# Patient Record
Sex: MALE | Race: WHITE
[De-identification: names, ages, dates, MRNs, and addresses within clinical notes are randomized per-mention and may not be internally consistent; named-entity substitution may affect disease eponyms.]

---

## 2019-08-20 ENCOUNTER — HOSPITAL ENCOUNTER (OUTPATIENT)
Dept: HOSPITAL 47 - OR | Age: 39
Discharge: HOME | End: 2019-08-20
Attending: SURGERY
Payer: COMMERCIAL

## 2019-08-20 VITALS — TEMPERATURE: 97.3 F | RESPIRATION RATE: 16 BRPM

## 2019-08-20 VITALS — DIASTOLIC BLOOD PRESSURE: 67 MMHG | HEART RATE: 58 BPM | SYSTOLIC BLOOD PRESSURE: 103 MMHG

## 2019-08-20 VITALS — BODY MASS INDEX: 29.2 KG/M2

## 2019-08-20 DIAGNOSIS — Z98.890: ICD-10-CM

## 2019-08-20 DIAGNOSIS — G47.33: ICD-10-CM

## 2019-08-20 DIAGNOSIS — Z80.9: ICD-10-CM

## 2019-08-20 DIAGNOSIS — Z99.89: ICD-10-CM

## 2019-08-20 DIAGNOSIS — E78.5: ICD-10-CM

## 2019-08-20 DIAGNOSIS — D17.6: ICD-10-CM

## 2019-08-20 DIAGNOSIS — I10: ICD-10-CM

## 2019-08-20 DIAGNOSIS — F32.9: ICD-10-CM

## 2019-08-20 DIAGNOSIS — K40.90: Primary | ICD-10-CM

## 2019-08-20 DIAGNOSIS — Z79.899: ICD-10-CM

## 2019-08-20 DIAGNOSIS — F17.210: ICD-10-CM

## 2019-08-20 PROCEDURE — 88304 TISSUE EXAM BY PATHOLOGIST: CPT

## 2019-08-20 PROCEDURE — 49650 LAP ING HERNIA REPAIR INIT: CPT

## 2019-08-20 RX ADMIN — POTASSIUM CHLORIDE SCH MLS: 14.9 INJECTION, SOLUTION INTRAVENOUS at 06:45

## 2019-08-20 NOTE — P.GSHP
History of Present Illness


H&P Date: 08/20/19


Chief Complaint: Left inguinal hernia





This is a 39-year-old male who presents today for laparoscopic robotic





Left inguinal hernia.  Patient developed a tender mass left groin.





Past Medical History


Past Medical History: Hyperlipidemia, Hypertension, Sleep Apnea/CPAP/BIPAP


Additional Past Medical History / Comment(s): CPAP USE


History of Any Multi-Drug Resistant Organisms: None Reported


Past Surgical History: Hernia Repair


Past Anesthesia/Blood Transfusion Reactions: No Reported Reaction


Past Psychological History: Depression


Smoking Status: Current every day smoker


Past Alcohol Use History: Occasional


Past Drug Use History: None Reported





- Past Family History


  ** Mother


Family Medical History: Cancer





Medications and Allergies


                                Home Medications











 Medication  Instructions  Recorded  Confirmed  Type


 


Lisinopril 20 mg PO DAILY 08/14/19 08/14/19 History


 


Nf-Zoloft Dose Unknown 1 tab PO QAM 08/14/19 08/14/19 History


 


Simvastatin [Zocor] 20 mg PO HS 08/14/19 08/20/19 History








                                    Allergies











Allergy/AdvReac Type Severity Reaction Status Date / Time


 


No Known Allergies Allergy   Verified 08/20/19 06:11














Surgical - Exam


                                   Vital Signs











Temp Pulse Resp BP Pulse Ox


 


 98.3 F   61   17   127/78   98 


 


 08/20/19 06:14  08/20/19 06:14  08/20/19 06:14  08/20/19 06:14  08/20/19 06:14














- General


well developed, well nourished, no distress





- Eyes


PERRL





- ENT


normal pinna





- Neck


no masses





- Respiratory


normal expansion





- Cardiovascular


Rhythm: regular





- Abdomen


Abdomen: soft, non tender





Assessment and Plan


Assessment: 





Left inguinal hernia.  We'll perform laparoscopic repair of left inguinal 

hernia.

## 2019-08-20 NOTE — P.OP
Date of Procedure: 08/20/19


Preoperative Diagnosis: 


Left inguinal hernia


Postoperative Diagnosis: 


Left inguinal hernia


Procedure(s) Performed: 


Laparoscopic robotic repair of left inguinal hernia





Excision of left cord lipoma


Anesthesia: SHASHA


Surgeon: Chad Husain


Estimated Blood Loss (ml): 5


Pathology: other (Left cord lipoma)


Condition: stable


Disposition: PACU


Description of Procedure: 


The patient's placed on the operating table in the supine position.  The patient

received general anesthesia.  The patient's abdomen was prepped and draped in 

usual sterile fashion.  The skin was anesthetized 1% local Xylocaine at the 

incision sites.  Using an 11 blade a skin incision was made at the umbilicus.  

The fascia was grasped with a [place] and then the peritoneal cavity was entered 

with the Veress needle.  Position of the Veress needle was confirmed with a 

positive drop test.  After adequate insufflation a 5 mm trocar was placed into 

the peritoneal cavity.


The Laparoscope was placed the peritoneal cavity.  And a robotic 8 mm trocar was

placed in the right lateral position and then another 8 mm robotic trochars 

placed in the left lateral position.  The original 5 mm trocar was exchanged for

a 12 mm trocar.  The patient was placed in reverse Trendelenburg and then the 

patient was docked to the robot.





Next the peritoneum over top of the hernia was incised and then using blunt and 

sharp dissection and electrocautery the hernia sac was dissected free from the 

floor of the inguinal canal.  A cord lipoma was dissected free and sent to 

pathology.  The hernia sac was completely reduced into the peritoneal cavity.  

And then using the Pro  mesh the hernia was repaired.  The peritoneum was 

then sutured with 20V lock suture.  The patient was then undocked the robot.  

The needle was withdrawn from the peritoneal cavity.  The umbilical trocar site 

was closed with 0 Ethibond suture.  The skin was closed interrupted 3-0 Monocryl

suture.  Dermabond dressing was applied.  Patient was sent to recovery in stable

condition.